# Patient Record
Sex: FEMALE | Race: WHITE | NOT HISPANIC OR LATINO | URBAN - NONMETROPOLITAN AREA
[De-identification: names, ages, dates, MRNs, and addresses within clinical notes are randomized per-mention and may not be internally consistent; named-entity substitution may affect disease eponyms.]

---

## 2018-02-06 ENCOUNTER — APPOINTMENT (RX ONLY)
Dept: URBAN - NONMETROPOLITAN AREA CLINIC 13 | Facility: CLINIC | Age: 48
Setting detail: DERMATOLOGY
End: 2018-02-06

## 2018-02-06 DIAGNOSIS — Z41.9 ENCOUNTER FOR PROCEDURE FOR PURPOSES OTHER THAN REMEDYING HEALTH STATE, UNSPECIFIED: ICD-10-CM

## 2018-02-06 PROCEDURE — ? LASER BROWN SPOTS

## 2018-02-06 ASSESSMENT — LOCATION SIMPLE DESCRIPTION DERM: LOCATION SIMPLE: INFERIOR FOREHEAD

## 2018-02-06 ASSESSMENT — LOCATION ZONE DERM: LOCATION ZONE: FACE

## 2018-02-06 ASSESSMENT — LOCATION DETAILED DESCRIPTION DERM: LOCATION DETAILED: INFERIOR MID FOREHEAD

## 2018-02-06 NOTE — PROCEDURE: LASER BROWN SPOTS
Detail Level: Zone
Laser Type: Spectra
Spot Size In Mm: 3
External Cooling Fan Speed: 5
Price (Use Numbers Only, No Special Characters Or $): 100
Post Procedure Text: Vaseline and ice applied. Post care reviewed with patient.
Consent: Written consent obtained, risks reviewed including but not limited to crusting, scabbing, blistering, scarring, darker or lighter pigmentary change, and/or incomplete removal.
Post-Care Instructions: I reviewed with the patient in detail post-care instructions.  Pt should stay away from the sun and wear sun protection until fully healed.

## 2018-02-13 ENCOUNTER — APPOINTMENT (RX ONLY)
Dept: URBAN - NONMETROPOLITAN AREA CLINIC 13 | Facility: CLINIC | Age: 48
Setting detail: DERMATOLOGY
End: 2018-02-13

## 2018-02-13 DIAGNOSIS — Z41.9 ENCOUNTER FOR PROCEDURE FOR PURPOSES OTHER THAN REMEDYING HEALTH STATE, UNSPECIFIED: ICD-10-CM

## 2018-02-13 PROCEDURE — ? JUVEDERM VOLUMA XC INJECTION

## 2018-02-13 PROCEDURE — ? BELOTERO INJECTION

## 2018-02-13 PROCEDURE — ? BOTOX

## 2018-02-13 NOTE — HPI: FACE (AGING FACE)
Is This A New Presentation, Or A Follow-Up?: Aging Face
Additional History: patient has Valtrex at home.

## 2018-02-13 NOTE — PROCEDURE: BELOTERO INJECTION
Cheeks Filler Volume In Cc: 0
Additional Anesthesia Volume In Cc: 6
Anesthesia Type: 0.2% lidocaine (mixed within filler)
Topical Anesthesia?: 23% lidocaine, 7% tetracaine
Price (Use Numbers Only, No Special Characters Or $): 450
Map Statement: See Attach Map for Complete Details
Vermilion Lips Filler Volume In Cc: 0.5
Detail Level: Detailed
Additional Area 2 Location: [facility]
Filler: Belotero
Additional Area 1 Location: lip
Use Map Statement For Sites (Optional): No
Additional Area 3 Location: melolabial folds
Consent: Written consent obtained. Risks include but not limited to bruising, beading, irregular texture, ulceration, infection, allergic reaction, scar formation, incomplete augmentation, temporary nature, procedural pain.
Expiration Date (Month Year): 11/18
Procedural Text: The filler was administered to the treatment areas noted above.
Post-Care Instructions: Patient instructed to apply ice to reduce swelling.
Lot #: [id number]

## 2018-02-13 NOTE — PROCEDURE: JUVEDERM VOLUMA XC INJECTION
Tear Troughs Filler Volume In Cc: 0
Price (Use Numbers Only, No Special Characters Or $): 900
Map Statment: See Attach Map for Complete Details
Lot #: [id number]
Consent: Written consent obtained. Risks include but not limited to bruising, beading, irregular texture, ulceration, infection, allergic reaction, scar formation, incomplete augmentation, temporary nature, procedural pain.
Expiration Date (Month Year): 4/19
Use Map Statement For Sites (Optional): No
Procedural Text: The filler was administered to the treatment areas noted above. Injected into cheek area.
Detail Level: Detailed
Post-Care Instructions: Patient instructed to apply ice to reduce swelling.
Filler: Juvederm Voluma XC
Cheeks Filler Volume In Cc: 1

## 2018-02-13 NOTE — PROCEDURE: BOTOX
Additional Area 5 Units: 0
Price (Use Numbers Only, No Special Characters Or $): 778
Lot #: 
Glabellar Complex Units: 25
Dilution (U/0.1 Cc): 1
Expiration Date (Month Year): 8/20
Additional Area 6 Location: total units
Additional Area 1 Location: [facility]
Additional Area 3 Location: platysma
Forehead Units: 3
Additional Area 2 Location: brow
Detail Level: Zone
Post-Care Instructions: Patient instructed to not lie down for 4 hours and limit physical activity for 24 hours. Patient instructed not to travel by airplane for 48 hours.
Consent: Written consent obtained. Risks include but not limited to lid/brow ptosis, bruising, swelling, diplopia, temporary effect, incomplete chemical denervation.

## 2018-05-03 ENCOUNTER — APPOINTMENT (RX ONLY)
Dept: URBAN - NONMETROPOLITAN AREA CLINIC 13 | Facility: CLINIC | Age: 48
Setting detail: DERMATOLOGY
End: 2018-05-03

## 2018-05-03 DIAGNOSIS — Z41.9 ENCOUNTER FOR PROCEDURE FOR PURPOSES OTHER THAN REMEDYING HEALTH STATE, UNSPECIFIED: ICD-10-CM

## 2018-05-03 PROCEDURE — ? BOTOX

## 2018-05-03 NOTE — PROCEDURE: BOTOX
Additional Area 1 Units: 0
Consent: Written consent obtained. Risks include but not limited to lid/brow ptosis, bruising, swelling, diplopia, temporary effect, incomplete chemical denervation.
Additional Area 3 Location: chin
Post-Care Instructions: Patient instructed to not lie down for 4 hours and limit physical activity for 24 hours. Patient instructed not to travel by airplane for 48 hours.
Forehead Units: 3
Additional Area 4 Location: ben CISNEROS
Dilution (U/0.1 Cc): 1
Glabellar Complex Units: 25
Additional Area 2 Location: [facility]
Additional Area 1 Location: laynelatanya
Detail Level: Zone
Expiration Date (Month Year): 10/20
Additional Area 5 Location: lip,
Price (Use Numbers Only, No Special Characters Or $): 305
Lot #: 
Additional Area 6 Location: L side of nose

## 2018-06-12 ENCOUNTER — APPOINTMENT (RX ONLY)
Dept: URBAN - NONMETROPOLITAN AREA CLINIC 13 | Facility: CLINIC | Age: 48
Setting detail: DERMATOLOGY
End: 2018-06-12

## 2018-06-12 DIAGNOSIS — Z41.9 ENCOUNTER FOR PROCEDURE FOR PURPOSES OTHER THAN REMEDYING HEALTH STATE, UNSPECIFIED: ICD-10-CM

## 2018-06-12 PROCEDURE — ? JUVEDERM VOLUMA XC INJECTION

## 2018-06-12 PROCEDURE — ? BELOTERO INJECTION

## 2018-06-12 NOTE — PROCEDURE: JUVEDERM VOLUMA XC INJECTION
Additional Area 2 Volume In Cc: 0
Lot #: [id number]
Detail Level: Detailed
Expiration Date (Month Year): 6/19
Anesthesia Volume In Cc: 0.5
Consent: Written consent obtained. Risks include but not limited to bruising, beading, irregular texture, ulceration, infection, allergic reaction, scar formation, incomplete augmentation, temporary nature, procedural pain.
Procedural Text: The filler was administered to the treatment areas noted above. Injected into cheek area.
Price (Use Numbers Only, No Special Characters Or $): 900
Cheeks Filler Volume In Cc: 1
Map Statment: See Attach Map for Complete Details
Use Map Statement For Sites (Optional): No
Post-Care Instructions: Patient instructed to apply ice to reduce swelling.
Filler: Juvederm Voluma XC

## 2018-06-12 NOTE — PROCEDURE: BELOTERO INJECTION
Expiration Date (Month Year): 2/19
Use Map Statement For Sites (Optional): No
Filler: Belotero
Procedural Text: pt changed her mind after I mixed syringe. She wants to get it done next week instead. She paid for syringe I will save it until next week.
Dorsal Hands Filler Volume In Cc: 0
Price (Use Numbers Only, No Special Characters Or $): 450
Map Statement: See Attach Map for Complete Details
Additional Area 1 Location: perioral rhytides
Anesthesia Type: 0.2% lidocaine (mixed within filler)
Consent: Written consent obtained. Risks include but not limited to bruising, beading, irregular texture, ulceration, infection, allergic reaction, scar formation, incomplete augmentation, temporary nature, procedural pain.
Additional Area 3 Location: melolabial folds
Post-Care Instructions: Patient instructed to apply ice to reduce swelling.
Additional Area 2 Location: [facility]
Vermilion Lips Filler Volume In Cc: 1
Additional Area 4 Location: glabella
Detail Level: Detailed
Anesthesia Volume In Cc: 0.2
Additional Area 5 Location: nose scar
Lot #: [id number]

## 2018-08-07 ENCOUNTER — APPOINTMENT (RX ONLY)
Dept: URBAN - NONMETROPOLITAN AREA CLINIC 13 | Facility: CLINIC | Age: 48
Setting detail: DERMATOLOGY
End: 2018-08-07

## 2018-11-20 ENCOUNTER — APPOINTMENT (RX ONLY)
Dept: URBAN - NONMETROPOLITAN AREA CLINIC 13 | Facility: CLINIC | Age: 48
Setting detail: DERMATOLOGY
End: 2018-11-20

## 2018-11-20 DIAGNOSIS — Z41.9 ENCOUNTER FOR PROCEDURE FOR PURPOSES OTHER THAN REMEDYING HEALTH STATE, UNSPECIFIED: ICD-10-CM

## 2018-11-20 PROCEDURE — ? JUVEDERM VOLUMA XC INJECTION

## 2018-11-20 PROCEDURE — ? BELOTERO INJECTION

## 2018-11-20 NOTE — PROCEDURE: JUVEDERM VOLUMA XC INJECTION
Detail Level: Detailed
Lot #: [id number]
Mid Face Filler Volume In Cc: 0
Anesthesia Volume In Cc: 0.5
Use Map Statement For Sites (Optional): No
Filler: Juvederm Voluma XC
Procedural Text: The filler was administered to the treatment areas noted above. Injected into cheek area.
Consent: Written consent obtained. Risks include but not limited to bruising, beading, irregular texture, ulceration, infection, allergic reaction, scar formation, incomplete augmentation, temporary nature, procedural pain.
Post-Care Instructions: Patient instructed to apply ice to reduce swelling.
Expiration Date (Month Year): 10/19
Map Statment: See Attach Map for Complete Details
Price (Use Numbers Only, No Special Characters Or $): 900
Cheeks Filler Volume In Cc: 1

## 2018-12-19 ENCOUNTER — APPOINTMENT (RX ONLY)
Dept: URBAN - METROPOLITAN AREA CLINIC 1 | Facility: CLINIC | Age: 48
Setting detail: DERMATOLOGY
End: 2018-12-19

## 2018-12-19 DIAGNOSIS — Z41.9 ENCOUNTER FOR PROCEDURE FOR PURPOSES OTHER THAN REMEDYING HEALTH STATE, UNSPECIFIED: ICD-10-CM

## 2018-12-19 PROCEDURE — ? BOTOX

## 2018-12-19 NOTE — PROCEDURE: BOTOX
Additional Area 2 Location: [facility],
Additional Area 2 Units: 0
Additional Area 5 Location: lip
Additional Area 1 Location: crowsfeet, L
Additional Area 6 Location: chin
Forehead Units: 3
Lot #: 
Dilution (U/0.1 Cc): 1
Additional Area 3 Location: bunnies
Consent: Written consent obtained. Risks include but not limited to lid/brow ptosis, bruising, swelling, diplopia, temporary effect, incomplete chemical denervation.
Detail Level: Zone
Post-Care Instructions: Patient instructed to not lie down for 4 hours and limit physical activity for 24 hours. Patient instructed not to travel by airplane for 48 hours.
Glabellar Complex Units: 25
Expiration Date (Month Year): 4/21
Price (Use Numbers Only, No Special Characters Or $): 308
Additional Area 4 Location: brow

## 2019-01-09 ENCOUNTER — APPOINTMENT (RX ONLY)
Dept: URBAN - NONMETROPOLITAN AREA CLINIC 13 | Facility: CLINIC | Age: 49
Setting detail: DERMATOLOGY
End: 2019-01-09

## 2019-01-09 DIAGNOSIS — Z41.9 ENCOUNTER FOR PROCEDURE FOR PURPOSES OTHER THAN REMEDYING HEALTH STATE, UNSPECIFIED: ICD-10-CM

## 2019-01-09 PROCEDURE — ? SCITON BBL SKINTYTE

## 2019-01-09 NOTE — PROCEDURE: SCITON BBL SKINTYTE
Location Override (Will Not Show Above If Text Entered): face,neck,chest
Intesity In W/Cm2 (Optional): 8-10
Post Procedure Text: The patient tolerated the procedure well. Post care was reviewed with the patient.
Time In Seconds (Optional): 15
Temp In C (Optional): 20
Time In Seconds (Optional): 20-15
Shots (Optional): x2 passes
Consent: Written consent obtained, risks reviewed including but not limited to crusting, scabbing, blistering, scarring, darker or lighter pigmentary change, bruising, and/or incomplete response.
Device Serial Number (Optional): face, neck
Detail Level: Zone
Anesthesia Volume In Cc: 0
Post-Care Instructions: I reviewed with the patient in detail post-care instructions. Patient should stay away from the sun and wear sun protection until treated areas are fully healed.
Intesity In W/Cm2 (Optional): 9-14
Intesity In W/Cm2 (Optional): 14
Shots (Optional): passes 3
Preprocedure Text: The treatment areas were thoroughly cleaned.
Price (Use Numbers Only, No Special Characters Or $): 670
Treatment Number: 1

## 2019-02-05 ENCOUNTER — APPOINTMENT (RX ONLY)
Dept: URBAN - NONMETROPOLITAN AREA CLINIC 13 | Facility: CLINIC | Age: 49
Setting detail: DERMATOLOGY
End: 2019-02-05

## 2019-02-05 DIAGNOSIS — L81.4 OTHER MELANIN HYPERPIGMENTATION: ICD-10-CM

## 2019-02-05 DIAGNOSIS — Z41.9 ENCOUNTER FOR PROCEDURE FOR PURPOSES OTHER THAN REMEDYING HEALTH STATE, UNSPECIFIED: ICD-10-CM

## 2019-02-05 PROCEDURE — ? SCITON BBL SKINTYTE

## 2019-02-05 NOTE — PROCEDURE: SCITON BBL SKINTYTE
Time In Seconds (Optional): 15
Intesity In W/Cm2 (Optional): 7-9
Consent: Written consent obtained, risks reviewed including but not limited to crusting, scabbing, blistering, scarring, darker or lighter pigmentary change, bruising, and/or incomplete response.
Price (Use Numbers Only, No Special Characters Or $): 071
Post Procedure Text: The patient tolerated the procedure well. Post care was reviewed with the patient.
Expected Treatment Number: 0
Intesity In W/Cm2 (Optional): 12
Location Override (Will Not Show Above If Text Entered): face,neck, chest
Intesity In W/Cm2 (Optional): 14
Temp In C (Optional): 20
Shots (Optional): [id number]
Device Serial Number (Optional): upper face
Location Override (Will Not Show Above If Text Entered): face, neck ,chest
Post-Care Instructions: I reviewed with the patient in detail post-care instructions. Patient should stay away from the sun and wear sun protection until treated areas are fully healed.
Detail Level: Zone
Time In Seconds (Optional): 10
Preprocedure Text: The treatment areas were thoroughly cleaned.
Treatment Number: 1
Shots (Optional): passes 3
Intesity In W/Cm2 (Optional): 8-14

## 2019-03-05 ENCOUNTER — APPOINTMENT (RX ONLY)
Dept: URBAN - NONMETROPOLITAN AREA CLINIC 13 | Facility: CLINIC | Age: 49
Setting detail: DERMATOLOGY
End: 2019-03-05

## 2019-03-05 DIAGNOSIS — L81.4 OTHER MELANIN HYPERPIGMENTATION: ICD-10-CM

## 2019-03-05 DIAGNOSIS — Z41.9 ENCOUNTER FOR PROCEDURE FOR PURPOSES OTHER THAN REMEDYING HEALTH STATE, UNSPECIFIED: ICD-10-CM

## 2019-03-05 PROCEDURE — ? SCITON BBL SKINTYTE

## 2019-03-05 PROCEDURE — ? Q-SWITCHED LASER

## 2019-03-05 ASSESSMENT — LOCATION SIMPLE DESCRIPTION DERM
LOCATION SIMPLE: RIGHT CHEEK
LOCATION SIMPLE: LEFT CHEEK

## 2019-03-05 ASSESSMENT — LOCATION DETAILED DESCRIPTION DERM
LOCATION DETAILED: LEFT INFERIOR CENTRAL MALAR CHEEK
LOCATION DETAILED: RIGHT INFERIOR CENTRAL MALAR CHEEK

## 2019-03-05 ASSESSMENT — LOCATION ZONE DERM: LOCATION ZONE: FACE

## 2019-03-05 NOTE — PROCEDURE: Q-SWITCHED LASER
External Cooling Fan Speed: 0
Passes: 2
Spot Size In Mm: 3
Detail Level: Detailed
Fluence: 7
Treatment Number: 1
Endpoint: Immediate endpoint erythema. Post care reviewed with patient.
Fluence: 1.5
Frequency In Hz: 5
Consent: Written consent obtained, risks reviewed including but not limited to crusting, scabbing, blistering, scarring, darker or lighter pigmentary change, systemic reactions, ulceration, incidental hair removal, bruising, and/or incomplete removal.
Post-Care Instructions: I reviewed with the patient in detail post-care instructions. Patient should avoid sunlight and wear sun protection.
Anesthesia Type: 1% lidocaine with epinephrine
Spot Size In Mm: 8
Fluence: 4.5
Frequency In Hz: 10
Fluence: 1.3

## 2019-03-05 NOTE — PROCEDURE: SCITON BBL SKINTYTE
Location Override (Will Not Show Above If Text Entered): face,neck,chest
Shots (Optional): 2xpasses
Time In Seconds (Optional): 20
Intesity In W/Cm2 (Optional): 9
Consent: Written consent obtained, risks reviewed including but not limited to crusting, scabbing, blistering, scarring, darker or lighter pigmentary change, bruising, and/or incomplete response.
Intesity In W/Cm2 (Optional): 8
Time In Seconds (Optional): 15
Anesthesia Volume In Cc: 0
Time In Seconds (Optional): 20-15
Post-Care Instructions: I reviewed with the patient in detail post-care instructions. Patient should stay away from the sun and wear sun protection until treated areas are fully healed.
Preprocedure Text: The treatment areas were thoroughly cleaned.
Intesity In W/Cm2 (Optional): 12
Price (Use Numbers Only, No Special Characters Or $): 055
Treatment Number: 1
Shots (Optional): passes 3
Shots (Optional): 5 passes
Detail Level: Zone
Intesity In W/Cm2 (Optional): 8-10
Post Procedure Text: The patient tolerated the procedure well. Post care was reviewed with the patient.

## 2019-03-14 ENCOUNTER — APPOINTMENT (RX ONLY)
Dept: URBAN - NONMETROPOLITAN AREA CLINIC 13 | Facility: CLINIC | Age: 49
Setting detail: DERMATOLOGY
End: 2019-03-14

## 2019-03-14 DIAGNOSIS — Z41.9 ENCOUNTER FOR PROCEDURE FOR PURPOSES OTHER THAN REMEDYING HEALTH STATE, UNSPECIFIED: ICD-10-CM

## 2019-03-14 PROCEDURE — ? BELOTERO INJECTION

## 2019-03-14 PROCEDURE — ? JUVEDERM VOLUMA XC INJECTION

## 2019-03-14 PROCEDURE — ? BOTOX

## 2019-03-14 NOTE — PROCEDURE: BOTOX
Post-Care Instructions: Patient instructed to not lie down for 4 hours and limit physical activity for 24 hours. Patient instructed not to travel by airplane for 48 hours.
Periorbital Skin Units: 0
Additional Area 3 Location: lower lash line
Additional Area 4 Location: brow
Price (Use Numbers Only, No Special Characters Or $): 308
Lot #: 
Detail Level: Zone
Dilution (U/0.1 Cc): 1
Glabellar Complex Units: 25
Additional Area 1 Location: crowsfeet, L
Expiration Date (Month Year): 8/21
Additional Area 6 Location: chin
Forehead Units: 3
Consent: Written consent obtained. Risks include but not limited to lid/brow ptosis, bruising, swelling, diplopia, temporary effect, incomplete chemical denervation.
Additional Area 2 Location: [facility],
Additional Area 5 Location: lip upper

## 2019-03-14 NOTE — PROCEDURE: JUVEDERM VOLUMA XC INJECTION
Vermilion Lips Filler Volume In Cc: 0
Additional Area 1 Volume In Cc: 1
Include Cannula Information In Note?: No
Post-Care Instructions: Patient instructed to apply ice to reduce swelling.
Lot #: [id number]
Anesthesia Volume In Cc: 0.5
Expiration Date (Month Year): 2/20
Price (Use Numbers Only, No Special Characters Or $): 900
Additional Area 1 Location: lateral mid face lift
Procedural Text: The filler was administered to the treatment areas noted above. Injected into cheek area.
Filler: Juvederm Voluma XC
Map Statment: See Attach Map for Complete Details
Detail Level: Detailed
Consent: Written consent obtained. Risks include but not limited to bruising, beading, irregular texture, ulceration, infection, allergic reaction, scar formation, incomplete augmentation, temporary nature, procedural pain.

## 2019-03-14 NOTE — PROCEDURE: BELOTERO INJECTION
Procedural Text: pt changed her mind after I mixed syringe. She wants to get it done next week instead. She paid for syringe I will save it until next week.
Marionette Lines Filler Volume In Cc: 0
Detail Level: Detailed
Lot #: [id number]
Map Statement: See Attach Map for Complete Details
Additional Area 4 Location: glabella
Include Cannula Information In Note?: No
Additional Area 2 Location: [facility]
Consent: Written consent obtained. Risks include but not limited to bruising, beading, irregular texture, ulceration, infection, allergic reaction, scar formation, incomplete augmentation, temporary nature, procedural pain.
Anesthesia Volume In Cc: 0.2
Expiration Date (Month Year): 11/19
Post-Care Instructions: Patient instructed to apply ice to reduce swelling.
Additional Area 5 Location: lower face rhytides
Additional Area 3 Location: melolabial folds
Filler: Belotero
Additional Area 1 Location: perioral rhytides, upper vermillion
Anesthesia Type: 0.2% lidocaine (mixed within filler)
Price (Use Numbers Only, No Special Characters Or $): 450

## 2019-07-09 ENCOUNTER — APPOINTMENT (RX ONLY)
Dept: URBAN - NONMETROPOLITAN AREA CLINIC 13 | Facility: CLINIC | Age: 49
Setting detail: DERMATOLOGY
End: 2019-07-09

## 2019-07-09 DIAGNOSIS — Z41.9 ENCOUNTER FOR PROCEDURE FOR PURPOSES OTHER THAN REMEDYING HEALTH STATE, UNSPECIFIED: ICD-10-CM

## 2019-07-09 PROCEDURE — ? BOTOX

## 2019-07-09 PROCEDURE — ? BELOTERO INJECTION

## 2019-07-09 NOTE — PROCEDURE: BELOTERO INJECTION
Anesthesia Type: 0.2% lidocaine (mixed within filler)
Use Map Statement For Sites (Optional): No
Additional Area 5 Volume In Cc: 0
Vermilion Lips Filler Volume In Cc: 0.8
Detail Level: Detailed
Procedural Text: pt changed her mind after I mixed syringe. She wants to get it done next week instead. She paid for syringe I will save it until next week.
Lot #: [id number]
Additional Area 4 Location: glabella
Additional Area 2 Location: [facility]
Anesthesia Volume In Cc: 0.2
Consent: Written consent obtained. Risks include but not limited to bruising, beading, irregular texture, ulceration, infection, allergic reaction, scar formation, incomplete augmentation, temporary nature, procedural pain.
Map Statement: See Attach Map for Complete Details
Expiration Date (Month Year): 5/20
Additional Area 5 Location: corners of mouth
Price (Use Numbers Only, No Special Characters Or $): 450
Post-Care Instructions: Patient instructed to apply ice to reduce swelling.
Additional Area 3 Location: melolabial folds
Filler: Belotero
Additional Area 1 Location: perioral rhytides

## 2019-07-09 NOTE — PROCEDURE: BOTOX
Additional Area 3 Location: chin
Additional Area 2 Location: [facility],
Detail Level: Zone
Additional Area 1 Location: laynelatanya
Anterior Platysmal Bands Units: 0
Post-Care Instructions: Patient instructed to not lie down for 4 hours and limit physical activity for 24 hours. Patient instructed not to travel by airplane for 48 hours.
Dilution (U/0.1 Cc): 1
Expiration Date (Month Year): 10/21
Additional Area 6 Location: lip upper
Additional Area 4 Location: ben CISNEROS
Additional Area 5 Location: perioral lines
Consent: Written consent obtained. Risks include but not limited to lid/brow ptosis, bruising, swelling, diplopia, temporary effect, incomplete chemical denervation.
Lot #: 
Glabellar Complex Units: 25
Forehead Units: 3
Price (Use Numbers Only, No Special Characters Or $): 305

## 2019-10-03 ENCOUNTER — APPOINTMENT (RX ONLY)
Dept: URBAN - NONMETROPOLITAN AREA CLINIC 13 | Facility: CLINIC | Age: 49
Setting detail: DERMATOLOGY
End: 2019-10-03

## 2019-10-03 DIAGNOSIS — Z41.9 ENCOUNTER FOR PROCEDURE FOR PURPOSES OTHER THAN REMEDYING HEALTH STATE, UNSPECIFIED: ICD-10-CM

## 2019-10-03 PROCEDURE — ? BOTOX

## 2019-10-03 NOTE — PROCEDURE: BOTOX
Expiration Date (Month Year): 2/22
Forehead Units: 3
Lot #: 
Left Periorbital Skin Units: 0
Additional Area 1 Location: laynelatanya
Additional Area 4 Location: lip upper and lower
Show Right And Left Brow Units: No
Show Glabellar Units: Yes
Glabellar Complex Units: 25
Post-Care Instructions: Patient instructed to not lie down for 4 hours and limit physical activity for 24 hours. Patient instructed not to travel by airplane for 48 hours.
Additional Area 6 Location: under lower lash line
Additional Area 3 Location: bunny lines
Dilution (U/0.1 Cc): 1
Price (Use Numbers Only, No Special Characters Or $): 308
Detail Level: Zone
Additional Area 5 Location: brow
Additional Area 2 Location: lay's,
Consent: Written consent obtained. Risks include but not limited to lid/brow ptosis, bruising, swelling, diplopia, temporary effect, incomplete chemical denervation.

## 2020-01-28 ENCOUNTER — APPOINTMENT (RX ONLY)
Dept: URBAN - NONMETROPOLITAN AREA CLINIC 13 | Facility: CLINIC | Age: 50
Setting detail: DERMATOLOGY
End: 2020-01-28

## 2020-01-28 DIAGNOSIS — L81.4 OTHER MELANIN HYPERPIGMENTATION: ICD-10-CM

## 2020-01-28 DIAGNOSIS — Z41.9 ENCOUNTER FOR PROCEDURE FOR PURPOSES OTHER THAN REMEDYING HEALTH STATE, UNSPECIFIED: ICD-10-CM

## 2020-01-28 PROCEDURE — ? BOTOX

## 2020-01-28 PROCEDURE — ? Q-SWITCHED LASER

## 2020-01-28 ASSESSMENT — LOCATION ZONE DERM
LOCATION ZONE: FACE
LOCATION ZONE: TRUNK

## 2020-01-28 ASSESSMENT — LOCATION SIMPLE DESCRIPTION DERM
LOCATION SIMPLE: RIGHT CHEEK
LOCATION SIMPLE: LEFT CHEEK
LOCATION SIMPLE: RIGHT FOREHEAD
LOCATION SIMPLE: CHEST

## 2020-01-28 ASSESSMENT — LOCATION DETAILED DESCRIPTION DERM
LOCATION DETAILED: RIGHT INFERIOR MEDIAL FOREHEAD
LOCATION DETAILED: UPPER STERNUM
LOCATION DETAILED: LEFT INFERIOR CENTRAL MALAR CHEEK
LOCATION DETAILED: RIGHT INFERIOR CENTRAL MALAR CHEEK

## 2020-01-28 NOTE — PROCEDURE: Q-SWITCHED LASER
Treatment Number: 1
External Cooling Fan Speed: 0
Fluence: 4.5
Post-Care Instructions: I reviewed with the patient in detail post-care instructions. Patient should avoid sunlight and wear sun protection.
Passes: 2
Frequency In Hz: 5
Endpoint: Immediate endpoint erythema. Post care reviewed with patient.
Fluence: 7
Frequency In Hz: 10
Detail Level: Detailed
Fluence: 1.3
Price (Use Numbers Only, No Special Characters Or $): 300.0
Anesthesia Type: 1% lidocaine with epinephrine
Spot Size In Mm: 3
Consent: Written consent obtained, risks reviewed including but not limited to crusting, scabbing, blistering, scarring, darker or lighter pigmentary change, systemic reactions, ulceration, incidental hair removal, bruising, and/or incomplete removal.

## 2020-01-28 NOTE — PROCEDURE: BOTOX
Show Topical Anesthesia: Yes
Lcl Root Units: 0
Glabellar Complex Units: 25
Show Ucl Units: No
Additional Area 3 Location: bunny lines
Detail Level: Zone
Additional Area 5 Location: brow
Consent: Written consent obtained. Risks include but not limited to lid/brow ptosis, bruising, swelling, diplopia, temporary effect, incomplete chemical denervation.
Additional Area 2 Location: masseter, L and R
Additional Area 4 Location: lip upper
Post-Care Instructions: Patient instructed to not lie down for 4 hours and limit physical activity for 24 hours. Patient instructed not to travel by airplane for 48 hours.
Lot #: 
Expiration Date (Month Year): 8/22
Additional Area 6 Location: chin
Dilution (U/0.1 Cc): 1
Forehead Units: 3
Price (Use Numbers Only, No Special Characters Or $): 308.0
Additional Area 1 Location: [facility].

## 2020-02-20 ENCOUNTER — APPOINTMENT (RX ONLY)
Dept: URBAN - NONMETROPOLITAN AREA CLINIC 13 | Facility: CLINIC | Age: 50
Setting detail: DERMATOLOGY
End: 2020-02-20

## 2020-02-20 DIAGNOSIS — L81.4 OTHER MELANIN HYPERPIGMENTATION: ICD-10-CM

## 2020-02-20 PROCEDURE — ? Q-SWITCHED LASER

## 2020-02-20 ASSESSMENT — LOCATION ZONE DERM
LOCATION ZONE: FACE
LOCATION ZONE: TRUNK

## 2020-02-20 ASSESSMENT — LOCATION DETAILED DESCRIPTION DERM
LOCATION DETAILED: RIGHT INFERIOR CENTRAL MALAR CHEEK
LOCATION DETAILED: UPPER STERNUM
LOCATION DETAILED: LEFT CENTRAL MALAR CHEEK

## 2020-02-20 ASSESSMENT — LOCATION SIMPLE DESCRIPTION DERM
LOCATION SIMPLE: LEFT CHEEK
LOCATION SIMPLE: CHEST
LOCATION SIMPLE: RIGHT CHEEK

## 2020-02-20 NOTE — PROCEDURE: Q-SWITCHED LASER
Frequency In Hz: 1
Endpoint: Immediate endpoint erythema. Post care reviewed with patient.
Fluence: 4.5
Area In Cm^2: 0
Spot Size In Mm: 8
Passes: 3
Spot Size In Mm: 2
Frequency In Hz: 10
Anesthesia Type: 1% lidocaine with epinephrine
Price (Use Numbers Only, No Special Characters Or $): 300.0
Consent: Written consent obtained, risks reviewed including but not limited to crusting, scabbing, blistering, scarring, darker or lighter pigmentary change, systemic reactions, ulceration, incidental hair removal, bruising, and/or incomplete removal.
Fluence: 7
Post-Care Instructions: I reviewed with the patient in detail post-care instructions. Patient should avoid sunlight and wear sun protection.
Frequency In Hz: 5
Detail Level: Detailed
Fluence: 1.4
Fluence: 1.5

## 2020-06-30 ENCOUNTER — APPOINTMENT (RX ONLY)
Dept: URBAN - NONMETROPOLITAN AREA CLINIC 13 | Facility: CLINIC | Age: 50
Setting detail: DERMATOLOGY
End: 2020-06-30

## 2020-06-30 DIAGNOSIS — Z41.9 ENCOUNTER FOR PROCEDURE FOR PURPOSES OTHER THAN REMEDYING HEALTH STATE, UNSPECIFIED: ICD-10-CM

## 2020-06-30 PROCEDURE — ? BOTOX

## 2020-06-30 NOTE — PROCEDURE: BOTOX
Show Periorbital Units: Yes
Inferior Lateral Orbicularis Oculi Units: 0
Price (Use Numbers Only, No Special Characters Or $): 308
Show Mentalis Units: No
Post-Care Instructions: Patient instructed to not lie down for 4 hours and limit physical activity for 24 hours. Patient instructed not to travel by airplane for 48 hours.
Dilution (U/0.1 Cc): 1
Additional Area 3 Location: R side forehead
Detail Level: Zone
Additional Area 6 Location: lip upper
Glabellar Complex Units: 25
Expiration Date (Month Year): 12/22
Additional Area 1 Location: [facility]
Additional Area 4 Location: brow
Consent: Written consent obtained. Risks include but not limited to lid/brow ptosis, bruising, swelling, diplopia, temporary effect, incomplete chemical denervation.
Lot #: 
Forehead Units: 3
Additional Area 2 Location: chin
Additional Area 5 Location: maxine lines

## 2020-10-01 ENCOUNTER — APPOINTMENT (RX ONLY)
Dept: URBAN - NONMETROPOLITAN AREA CLINIC 13 | Facility: CLINIC | Age: 50
Setting detail: DERMATOLOGY
End: 2020-10-01

## 2020-10-01 DIAGNOSIS — Z41.9 ENCOUNTER FOR PROCEDURE FOR PURPOSES OTHER THAN REMEDYING HEALTH STATE, UNSPECIFIED: ICD-10-CM

## 2020-10-01 PROCEDURE — ? BOTOX

## 2020-10-01 NOTE — PROCEDURE: BOTOX
Additional Area 1 Units: 0
Show Ucl Units: No
Additional Area 5 Location: chin
Show Additional Area 1: Yes
Consent: Written consent obtained. Risks include but not limited to lid/brow ptosis, bruising, swelling, diplopia, temporary effect, incomplete chemical denervation.
Additional Area 2 Location: upper lip
Lot #: 
Expiration Date (Month Year): 4/23
Additional Area 4 Location: forehead, stay high in center
Forehead Units: 3
Post-Care Instructions: Patient instructed to not lie down for 4 hours and limit physical activity for 24 hours. Patient instructed not to travel by airplane for 48 hours.
Additional Area 1 Location: under eyes
Additional Area 6 Location: brow,
Glabellar Complex Units: 25
Dilution (U/0.1 Cc): 1
Price (Use Numbers Only, No Special Characters Or $): 308
Detail Level: Zone
Additional Area 3 Location: brow

## 2021-01-05 ENCOUNTER — APPOINTMENT (RX ONLY)
Dept: URBAN - NONMETROPOLITAN AREA CLINIC 13 | Facility: CLINIC | Age: 51
Setting detail: DERMATOLOGY
End: 2021-01-05

## 2021-01-05 DIAGNOSIS — Z41.9 ENCOUNTER FOR PROCEDURE FOR PURPOSES OTHER THAN REMEDYING HEALTH STATE, UNSPECIFIED: ICD-10-CM

## 2021-01-05 PROCEDURE — ? BOTOX

## 2021-01-05 NOTE — PROCEDURE: BOTOX
Additional Area 6 Location: lateral low on FH for fine rhytides. She gets Botox in glabella and FH by physician for migranes
Price (Use Numbers Only, No Special Characters Or $): 308
Dilution (U/0.1 Cc): 1
Show Additional Area 2: Yes
Lateral Platysmal Bands Units: 0
Additional Area 3 Location: brow
Forehead Units: 3
Show Lcl Units: No
Consent: Written consent obtained. Risks include but not limited to lid/brow ptosis, bruising, swelling, diplopia, temporary effect, incomplete chemical denervation.
Additional Area 5 Location: chin
Lot #: 
Additional Area 2 Location: upper lip
Glabellar Complex Units: 25
Expiration Date (Month Year): 9/23
Detail Level: Zone
Additional Area 4 Location: under eyes
Post-Care Instructions: Patient instructed to not lie down for 4 hours and limit physical activity for 24 hours. Patient instructed not to travel by airplane for 48 hours.

## 2021-06-01 ENCOUNTER — APPOINTMENT (RX ONLY)
Dept: URBAN - NONMETROPOLITAN AREA CLINIC 13 | Facility: CLINIC | Age: 51
Setting detail: DERMATOLOGY
End: 2021-06-01

## 2021-06-01 DIAGNOSIS — Z41.9 ENCOUNTER FOR PROCEDURE FOR PURPOSES OTHER THAN REMEDYING HEALTH STATE, UNSPECIFIED: ICD-10-CM

## 2021-06-01 PROCEDURE — ? BOTOX

## 2021-06-01 NOTE — PROCEDURE: BOTOX
Periorbital Skin Units: 0
Show Levator Superior Units: Yes
Additional Area 5 Location: forhead R side 1 unit.
Show Lcl Units: No
Additional Area 3 Location: Right lateral eye
Additional Area 2 Location: upper lip
Consent: Written consent obtained. Risks include but not limited to lid/brow ptosis, bruising, swelling, diplopia, temporary effect, incomplete chemical denervation.
Additional Area 1 Location: chin
Additional Area 4 Location: under eyes
Forehead Units: 3
Expiration Date (Month Year): 11/23
Lot #: 
Detail Level: Zone
Dilution (U/0.1 Cc): 1
Price (Use Numbers Only, No Special Characters Or $): 308
Glabellar Complex Units: 25
Additional Area 6 Location: brow
Post-Care Instructions: Patient instructed to not lie down for 4 hours and limit physical activity for 24 hours. Patient instructed not to travel by airplane for 48 hours.

## 2025-01-20 NOTE — PROCEDURE: BELOTERO INJECTION
Map Statement: See Attach Map for Complete Details
Decollete Filler Volume In Cc: 0
Anesthesia Volume In Cc: 0.2
Consent: Written consent obtained. Risks include but not limited to bruising, beading, irregular texture, ulceration, infection, allergic reaction, scar formation, incomplete augmentation, temporary nature, procedural pain.
Additional Area 2 Location: [facility]
Additional Area 5 Location: crows feet
Additional Area 3 Location: melolabial folds
Price (Use Numbers Only, No Special Characters Or $): 450
Post-Care Instructions: Patient instructed to apply ice to reduce swelling.
Expiration Date (Month Year): 11/19
Filler: Belotero
Additional Area 1 Location: perioral rhytides
Use Map Statement For Sites (Optional): No
Detail Level: Detailed
Vermilion Lips Filler Volume In Cc: 0.6
Additional Area 4 Location: glabella
Procedural Text: pt changed her mind after I mixed syringe. She wants to get it done next week instead. She paid for syringe I will save it until next week.
Anesthesia Type: 0.2% lidocaine (mixed within filler)
Additional Area 1 Volume In Cc: 0.4
Lot #: [id number]
bedside